# Patient Record
Sex: FEMALE | Race: BLACK OR AFRICAN AMERICAN | NOT HISPANIC OR LATINO | URBAN - METROPOLITAN AREA
[De-identification: names, ages, dates, MRNs, and addresses within clinical notes are randomized per-mention and may not be internally consistent; named-entity substitution may affect disease eponyms.]

---

## 2017-11-10 ENCOUNTER — EMERGENCY (EMERGENCY)
Facility: HOSPITAL | Age: 27
LOS: 0 days | Discharge: HOME | End: 2017-11-10
Attending: STUDENT IN AN ORGANIZED HEALTH CARE EDUCATION/TRAINING PROGRAM
Payer: MEDICAID

## 2017-11-10 VITALS
HEIGHT: 67 IN | WEIGHT: 190.04 LBS | SYSTOLIC BLOOD PRESSURE: 127 MMHG | DIASTOLIC BLOOD PRESSURE: 74 MMHG | HEART RATE: 103 BPM | TEMPERATURE: 99 F | RESPIRATION RATE: 16 BRPM

## 2017-11-10 DIAGNOSIS — Z98.890 OTHER SPECIFIED POSTPROCEDURAL STATES: Chronic | ICD-10-CM

## 2017-11-10 DIAGNOSIS — Z98.890 OTHER SPECIFIED POSTPROCEDURAL STATES: ICD-10-CM

## 2017-11-10 DIAGNOSIS — Z98.89 OTHER SPECIFIED POSTPROCEDURAL STATES: Chronic | ICD-10-CM

## 2017-11-10 DIAGNOSIS — Z48.03 ENCOUNTER FOR CHANGE OR REMOVAL OF DRAINS: ICD-10-CM

## 2017-11-10 PROCEDURE — 99284 EMERGENCY DEPT VISIT MOD MDM: CPT

## 2017-11-10 NOTE — ED ADULT NURSE NOTE - OBJECTIVE STATEMENT
Pt had Abdominoplasty and breast lift dine over 2weeks ago. Patient has Oz  Ashley drain bilateral-serous drain.Surgical incision to bilateral breast

## 2017-11-10 NOTE — ED PROVIDER NOTE - MEDICAL DECISION MAKING DETAILS
pt presented for drainage removal s/p tummy tuck, on call surgeon Dr Lunsford called to check and remove the drainage, states that pt can follow up in his office today

## 2017-11-10 NOTE — ED PROVIDER NOTE - OBJECTIVE STATEMENT
27 year old female with no past medical history presents today s/p abdominoplasty on Oct 30th in [place], pt was there for ten days, prior to leaving her surgeon did not feel the drains placed where ready to come out due persistent drainage, pt states that she did coordinate with her PMD who told her she would be able to remove it, however when she went to her yesterday she was told she would have to go to the ER, she has been in contact with her surgeon and sending him pictures of her drainage and told it can come out (-)fevers (-) discharge, pt is due to go back to [place] at six weeks for postop check

## 2018-09-17 NOTE — ED ADULT TRIAGE NOTE - CHIEF COMPLAINT QUOTE
Interval History:     No diarrhoea    Review of Systems   Gastrointestinal: Positive for abdominal distention and abdominal pain.   All other systems reviewed and are negative.    Objective:     Vital Signs (Most Recent):  Temp: 98.3 °F (36.8 °C) (09/17/18 1640)  Pulse: 88 (09/17/18 1640)  Resp: 16 (09/17/18 1640)  BP: (!) 152/74 (09/17/18 1640)  SpO2: 100 % (09/17/18 1541) Vital Signs (24h Range):  Temp:  [96.1 °F (35.6 °C)-98.3 °F (36.8 °C)] 98.3 °F (36.8 °C)  Pulse:  [66-91] 88  Resp:  [14-20] 16  SpO2:  [99 %-100 %] 100 %  BP: (146-178)/(74-92) 152/74     Weight: 74 kg (163 lb 2.3 oz)  Body mass index is 30.83 kg/m².    Estimated Creatinine Clearance: 66.2 mL/min (based on SCr of 0.8 mg/dL).    Physical Exam   Constitutional: No distress.   Neck: No JVD present.   Cardiovascular: Normal rate and regular rhythm.   Pulmonary/Chest: Effort normal and breath sounds normal.   Abdominal: Soft. Bowel sounds are normal.   Musculoskeletal: She exhibits no edema.   Neurological: She is alert.   Skin: Skin is warm and dry. She is not diaphoretic.       Significant Labs:   BMP:   Recent Labs   Lab  09/17/18   0430   GLU  83   NA  139   K  3.6   CL  98   CO2  36*   BUN  7*   CREATININE  0.8   CALCIUM  8.8   MG  1.4*     CBC:   Recent Labs   Lab  09/16/18   0500  09/17/18   0430   WBC  7.29  5.94   HGB  10.0*  10.0*   HCT  32.5*  32.8*   PLT  197  196     CMP:   Recent Labs   Lab  09/16/18   0500  09/17/18   0430   NA  138  139   K  3.7  3.6   CL  97  98   CO2  35*  36*   GLU  81  83   BUN  7*  7*   CREATININE  0.8  0.8   CALCIUM  9.1  8.8   PROT  5.4*  5.4*   ALBUMIN  2.3*  2.4*   BILITOT  0.4  0.4   ALKPHOS  100  99   AST  15  14   ALT  10  10   ANIONGAP  6*  5*   EGFRNONAA  >60  >60       Significant Imaging: I have reviewed all pertinent imaging results/findings within the past 24 hours.   pt states she needs her surgical drains out from having a tummy tuck

## 2020-11-23 NOTE — ED ADULT NURSE NOTE - FALL HARM RISK TYPE OF ASSESSMENT
Patient requested that writer place the compazine in the bag of fluids instead of give it straight into her IV.     Admission